# Patient Record
Sex: FEMALE | URBAN - METROPOLITAN AREA
[De-identification: names, ages, dates, MRNs, and addresses within clinical notes are randomized per-mention and may not be internally consistent; named-entity substitution may affect disease eponyms.]

---

## 2021-03-11 ENCOUNTER — APPOINTMENT (RX ONLY)
Age: 41
Setting detail: DERMATOLOGY
End: 2021-03-11

## 2021-03-11 VITALS — TEMPERATURE: 97.7 F

## 2021-03-11 DIAGNOSIS — L98.8 OTHER SPECIFIED DISORDERS OF THE SKIN AND SUBCUTANEOUS TISSUE: ICD-10-CM

## 2021-03-11 PROCEDURE — ? XEOMIN (U OR CC)

## 2021-03-11 ASSESSMENT — LOCATION DETAILED DESCRIPTION DERM
LOCATION DETAILED: RIGHT UPPER CUTANEOUS LIP
LOCATION DETAILED: LEFT UPPER CUTANEOUS LIP
LOCATION DETAILED: LEFT LOWER CUTANEOUS LIP
LOCATION DETAILED: GLABELLA

## 2021-03-11 ASSESSMENT — LOCATION ZONE DERM
LOCATION ZONE: LIP
LOCATION ZONE: FACE

## 2021-03-11 ASSESSMENT — LOCATION SIMPLE DESCRIPTION DERM
LOCATION SIMPLE: RIGHT LIP
LOCATION SIMPLE: LEFT LIP
LOCATION SIMPLE: GLABELLA

## 2021-03-11 NOTE — PROCEDURE: XEOMIN (U OR CC)
Post-Care Instructions: Patient instructed to not lie down for 4 hours and limit physical activity for 24 hours. Patient instructed not to travel by airplane for 48 hours.
Quantity Per Injection Site (Units Or Cc): 4 U
Glabellar Complex Units: 10
Anterior Platysmal Bands Units: 0
Quantity Per Injection Site (Units Or Cc): 1 U
Additional Area 1 Units: 6
Additional Area 1 Location: Obicularis oris
Consent: Written consent obtained. Risks include but not limited to lid/brow ptosis, bruising, swelling, diplopia, temporary effect, incomplete chemical denervation.
Price (Use Numbers Only, No Special Characters Or $): 192.0
Quantity Per Injection Site (Units Or Cc): 2 U
Expiration Date (Month Year): 01/2022
Dilution (U/0.1 Cc): 4
Detail Level: Detailed
Lot #: [id number]

## 2021-03-25 ENCOUNTER — APPOINTMENT (RX ONLY)
Age: 41
Setting detail: DERMATOLOGY
End: 2021-03-25

## 2021-03-25 VITALS — TEMPERATURE: 97.7 F

## 2021-03-25 DIAGNOSIS — L98.8 OTHER SPECIFIED DISORDERS OF THE SKIN AND SUBCUTANEOUS TISSUE: ICD-10-CM

## 2021-03-25 PROCEDURE — ? PATIENT SPECIFIC COUNSELING

## 2021-03-25 PROCEDURE — ? COSMETIC CONSULTATION: BOTULINUM TOXIN
